# Patient Record
Sex: MALE | Race: WHITE
[De-identification: names, ages, dates, MRNs, and addresses within clinical notes are randomized per-mention and may not be internally consistent; named-entity substitution may affect disease eponyms.]

---

## 2021-12-11 ENCOUNTER — HOSPITAL ENCOUNTER (EMERGENCY)
Dept: HOSPITAL 12 - ER | Age: 21
Discharge: HOME | End: 2021-12-11
Payer: COMMERCIAL

## 2021-12-11 VITALS — HEIGHT: 69 IN | BODY MASS INDEX: 23.55 KG/M2 | WEIGHT: 159 LBS

## 2021-12-11 DIAGNOSIS — H93.11: Primary | ICD-10-CM

## 2021-12-11 DIAGNOSIS — H91.91: ICD-10-CM

## 2021-12-11 PROCEDURE — A4663 DIALYSIS BLOOD PRESSURE CUFF: HCPCS

## 2021-12-11 NOTE — NUR
Patient discharged to home in stable condition with brisk steady gait.  Written 
and verbal after care instructions given to patient. Patient verbalized 
understanding & compliance of instructions. Stressed follow up with primary 
docotr & ENT doctor or return to ER for worsening s/s.